# Patient Record
Sex: FEMALE | Employment: FULL TIME | URBAN - METROPOLITAN AREA
[De-identification: names, ages, dates, MRNs, and addresses within clinical notes are randomized per-mention and may not be internally consistent; named-entity substitution may affect disease eponyms.]

---

## 2017-07-29 ENCOUNTER — TRANSFERRED RECORDS (OUTPATIENT)
Dept: HEALTH INFORMATION MANAGEMENT | Facility: CLINIC | Age: 42
End: 2017-07-29

## 2017-08-07 ENCOUNTER — TRANSFERRED RECORDS (OUTPATIENT)
Dept: HEALTH INFORMATION MANAGEMENT | Facility: CLINIC | Age: 42
End: 2017-08-07

## 2017-09-11 ENCOUNTER — MEDICAL CORRESPONDENCE (OUTPATIENT)
Dept: HEALTH INFORMATION MANAGEMENT | Facility: CLINIC | Age: 42
End: 2017-09-11

## 2017-09-11 ENCOUNTER — TRANSFERRED RECORDS (OUTPATIENT)
Dept: HEALTH INFORMATION MANAGEMENT | Facility: CLINIC | Age: 42
End: 2017-09-11

## 2017-09-13 ENCOUNTER — CARE COORDINATION (OUTPATIENT)
Dept: ONCOLOGY | Facility: CLINIC | Age: 42
End: 2017-09-13

## 2017-09-13 NOTE — PROGRESS NOTES
On 9/12/17 author received call from nurse Adriana for Dr. Chyna Thacker, [facility], Alyse requesting Dr. Chu Diana call Dr. Thacker about possible referral.  Later received message she left thanking author for having Dr. Diana call.  Went on to say Dr. Diana agreed to see pt rather than have pt wait until 10/9 to see Dr. Fuentes.    Per conversation with Dr. Diana, today, 9/13.  He will see the patient on 9/27 at 12:30.  Spoke with Ketty, coworker at oncology new pt scheduling.  Informed her of change in plan for pt to see Dr. Diana on 9/27.  Also stated did not find records in [facility] and that pt had BM bx at [facility].  Dr. Diana wants slides from BM bx reviewed by our pathology.  Ketty changed pt's new pt appt and stated colleague, Kallie received records from [facility] yesterday.  She will check on obtaining slides from BMbx for review here.  She states she would also contact Adriana (or Tatiana) at Dr. Thacker's office to let them know new appt date/time.

## 2017-09-18 ENCOUNTER — TRANSFERRED RECORDS (OUTPATIENT)
Dept: HEALTH INFORMATION MANAGEMENT | Facility: CLINIC | Age: 42
End: 2017-09-18

## 2017-09-19 ENCOUNTER — TRANSFERRED RECORDS (OUTPATIENT)
Dept: HEALTH INFORMATION MANAGEMENT | Facility: CLINIC | Age: 42
End: 2017-09-19

## 2017-09-27 ENCOUNTER — OFFICE VISIT (OUTPATIENT)
Dept: TRANSPLANT | Facility: CLINIC | Age: 42
End: 2017-09-27
Attending: INTERNAL MEDICINE
Payer: COMMERCIAL

## 2017-09-27 VITALS
HEIGHT: 67 IN | DIASTOLIC BLOOD PRESSURE: 88 MMHG | WEIGHT: 150 LBS | BODY MASS INDEX: 23.54 KG/M2 | OXYGEN SATURATION: 100 % | RESPIRATION RATE: 16 BRPM | HEART RATE: 78 BPM | TEMPERATURE: 97.9 F | SYSTOLIC BLOOD PRESSURE: 138 MMHG

## 2017-09-27 DIAGNOSIS — D47.2 MGUS (MONOCLONAL GAMMOPATHY OF UNKNOWN SIGNIFICANCE): Primary | ICD-10-CM

## 2017-09-27 PROBLEM — D70.9 NEUTROPENIA (H): Status: ACTIVE | Noted: 2017-09-27

## 2017-09-27 LAB
BASOPHILS # BLD AUTO: 0 10E9/L (ref 0–0.2)
BASOPHILS NFR BLD AUTO: 0 %
CRP SERPL-MCNC: 5.3 MG/L (ref 0–8)
DIFFERENTIAL METHOD BLD: ABNORMAL
EOSINOPHIL # BLD AUTO: 0.1 10E9/L (ref 0–0.7)
EOSINOPHIL NFR BLD AUTO: 3.8 %
ERYTHROCYTE [DISTWIDTH] IN BLOOD BY AUTOMATED COUNT: 12.9 % (ref 10–15)
HCT VFR BLD AUTO: 38.7 % (ref 35–47)
HGB BLD-MCNC: 13 G/DL (ref 11.7–15.7)
IMM GRANULOCYTES # BLD: 0 10E9/L (ref 0–0.4)
IMM GRANULOCYTES NFR BLD: 0 %
LYMPHOCYTES # BLD AUTO: 1.3 10E9/L (ref 0.8–5.3)
LYMPHOCYTES NFR BLD AUTO: 62.2 %
MCH RBC QN AUTO: 31.8 PG (ref 26.5–33)
MCHC RBC AUTO-ENTMCNC: 33.6 G/DL (ref 31.5–36.5)
MCV RBC AUTO: 95 FL (ref 78–100)
MONOCYTES # BLD AUTO: 0.3 10E9/L (ref 0–1.3)
MONOCYTES NFR BLD AUTO: 12 %
NEUTROPHILS # BLD AUTO: 0.5 10E9/L (ref 1.6–8.3)
NEUTROPHILS NFR BLD AUTO: 22 %
NRBC # BLD AUTO: 0 10*3/UL
NRBC BLD AUTO-RTO: 0 /100
PLATELET # BLD AUTO: 159 10E9/L (ref 150–450)
RBC # BLD AUTO: 4.09 10E12/L (ref 3.8–5.2)
TSH SERPL DL<=0.005 MIU/L-ACNC: 1.92 MU/L (ref 0.4–4)
WBC # BLD AUTO: 2.1 10E9/L (ref 4–11)

## 2017-09-27 PROCEDURE — 36415 COLL VENOUS BLD VENIPUNCTURE: CPT

## 2017-09-27 PROCEDURE — 85025 COMPLETE CBC W/AUTO DIFF WBC: CPT | Performed by: INTERNAL MEDICINE

## 2017-09-27 PROCEDURE — 99213 OFFICE O/P EST LOW 20 MIN: CPT | Mod: ZF

## 2017-09-27 PROCEDURE — 86162 COMPLEMENT TOTAL (CH50): CPT | Performed by: INTERNAL MEDICINE

## 2017-09-27 PROCEDURE — 84443 ASSAY THYROID STIM HORMONE: CPT | Performed by: INTERNAL MEDICINE

## 2017-09-27 PROCEDURE — 86160 COMPLEMENT ANTIGEN: CPT | Performed by: INTERNAL MEDICINE

## 2017-09-27 PROCEDURE — 86140 C-REACTIVE PROTEIN: CPT | Performed by: INTERNAL MEDICINE

## 2017-09-27 RX ORDER — FOLIC ACID 1 MG/1
1 TABLET ORAL DAILY
Qty: 60 TABLET | Refills: 3 | Status: SHIPPED | OUTPATIENT
Start: 2017-09-27

## 2017-09-27 ASSESSMENT — PAIN SCALES - GENERAL: PAINLEVEL: NO PAIN (0)

## 2017-09-27 NOTE — PROGRESS NOTES
HISTORY OF PRESENT ILLNESS:  I had the pleasure of seeing Willow Beltran, a 41-year-old woman, in consultation for a second opinion for Dr. Chyna Thacker for evaluation of monoclonal gammopathy of uncertain significance and neutropenia.  Willow tells me that she really started this process probably in January with recurrent infections.  She said that when she would have travel she would come down with a cold.  She states that this would happen every few weeks and she just was not feeling herself.  She noted around Memorial Day that she had a sore throat and a fever.  She was seen by urgent care.  A Strep screen was made and it was found that she had no evidence of Strep.  She subsequently had a blood count done which showed a white count of 2300 and an ANC of 600.  For her sore throat, she had been given some Decadron.  She was referred to an Infectious Disease specialist and was tested for HIV, mono, and other viral infections.  She was given Percocet for pain relief.  She was ultimately given azithromycin for 3 days.  Her mouth sores did improve, but she continued to have a low white blood count of 1900 on 06/28.  In July she was seen by Dr. Chyna Thacker.  Her white count was 1.5 with an ANC of 0.1.  She has marked rouleaux formation.  Her B12 is a little low at 246.  Her hemoglobin was 12.4 and platelet count 154,000.  Her smear showed absolute neutropenia without an aberrant phenotype or T-cells.  No LGLs were seen.  Her COURTNEY was negative.  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  Anti granulocyte and anti gluten antibodies were negative.She ultimately had a bone marrow biopsy done.  The bone marrow on 07/13 showed 50% normocellular marrow with normal red cell precursors.  The bone marrow aspirate showed that she had neutrophils, metamyelocytes, monocytes, eosinophils and blasts; in other words, full maturation.  However, she did have an absolute neutropenia of 100.  Other studies showed normal cytogenetics.  She also had  plasma cells, approximately 5% cellularity.  The plasma cells did show monotypic IgG kappa.  They were CD38 bright but did not express CD45, CD19, CD56 or CD20.  There was also a B-cell gene rearrangement analysis which showed a clonal B-cell population to be detected.  Serum protein electrophoresis showed a total protein of 8.1 with an IgG kappa of 2110, a monoclonal peak of  1.4 grams percent, increased kappa chains at 6.9 and free lambda of 1.5.  Ultrasound showed multiple gallstones within the gallbladder and a nonobstructing right kidney stone was noted within the renal pelvis.  A 24-hour urine protein electrophoresis did show evidence of monoclonal free kappa light chains.  She was seen at the [facility] for evaluation of the MGUS.  It was felt that she had an IgG kappa monoclonal gammopathy of uncertain significance.  A PET scan was done which showed no hypermetabolic lesions in August and no abnormal skeletal lesions.  She did have cholelithiasis.  Her spleen size was normal.  There was some increased uptake in her tonsils, presumably inflammatory.  She was tried on G-CSF and did have a brief response and then subsequently did not respond.  Her ANC initially went up to 2.9 but then after the third dose to 8.3.  On  she was started on prednisone 60 mg.  Her ANC did improve to 0.4 and on  it was up to 2.0.  Subsequently, the prednisone has been tapered off  2 days ago.  She is here for further evaluation.       PAST MEDICAL HISTORY:  She has had 3  sections.  She has had an IUD.      FAMILY HISTORY:  Her mother  of complications related to cervical cancer.  Her father is alive and well.  Her father had a cerebrovascular accident, high cholesterol and high blood pressure.  Her siblings are well.  She has four brothers.  One may have had hepatitis in the past, most likely    hepatitis A.  She has two children ages 10 and 7  who are healthy.       SOCIAL HISTORY:  She does not smoke.  She  has occasional alcohol.  She works as an .  Her  is in computer hardware.        REVIEW OF SYSTEMS:  A 12-point review of systems is done and is negative except as in HPI.      PHYSICAL EXAMINATION:   GENERAL:  She is an alert woman in no acute distress.   VITAL SIGNS:  Stable.   HEENT:  Normocephalic.  EOM okay, no scleral icterus.  No lesions noted in her mouth.   RESPIRATORY:  Clear to percussion and auscultation.   NECK:  She has mild thyromegaly but no nodules palpable.   CARDIAC:  Normal sinus rhythm.  No S3, S4 or murmur.   ABDOMEN:  Soft without hepatosplenomegaly.   EXTREMITIES:  Without edema.   LYMPH:  No cervical, inguinal or axillary adenopathy.   NEUROLOGIC:  Cranial nerves II-XII intact.  DTRs intact.    Results for ELEUTERIO JOHN (MRN [id number]) as of 9/27/2017 17:15   Ref. Range 9/27/2017 14:12   CRP Inflammation Latest Ref Range: 0.0 - 8.0 mg/L 5.3   TSH Latest Ref Range: 0.40 - 4.00 mU/L 1.92   WBC Latest Ref Range: 4.0 - 11.0 10e9/L 2.1 (L)   Hemoglobin Latest Ref Range: 11.7 - 15.7 g/dL 13.0   Hematocrit Latest Ref Range: 35.0 - 47.0 % 38.7   Platelet Count Latest Ref Range: 150 - 450 10e9/L 159   RBC Count Latest Ref Range: 3.8 - 5.2 10e12/L 4.09   MCV Latest Ref Range: 78 - 100 fl 95   MCH Latest Ref Range: 26.5 - 33.0 pg 31.8   MCHC Latest Ref Range: 31.5 - 36.5 g/dL 33.6   RDW Latest Ref Range: 10.0 - 15.0 % 12.9   Diff Method Unknown Automated Method   % Neutrophils Latest Units: % 22.0   % Lymphocytes Latest Units: % 62.2   % Monocytes Latest Units: % 12.0   % Eosinophils Latest Units: % 3.8   % Basophils Latest Units: % 0.0   % Immature Granulocytes Latest Units: % 0.0   Nucleated RBCs Latest Ref Range: 0 /100 0   Absolute Neutrophil Latest Ref Range: 1.6 - 8.3 10e9/L 0.5 (L)   Absolute Lymphocytes Latest Ref Range: 0.8 - 5.3 10e9/L 1.3   Absolute Monocytes Latest Ref Range: 0.0 - 1.3 10e9/L 0.3   Absolute Eosinophils Latest Ref Range: 0.0 - 0.7 10e9/L 0.1   Absolute  Basophils Latest Ref Range: 0.0 - 0.2 10e9/L 0.0   Abs Immature Granulocytes Latest Ref Range: 0 - 0.4 10e9/L 0.0   Absolute Nucleated RBC Unknown 0.0     ASSESSMENT:   1.  Neutropenia, presumed idiopathic.   2.  Monoclonal gammopathy of uncertain significance, IgG kappa.   3.  Gallstones.   4.  Kidney stone.   5.  Status post  section.      I spent about an hour with Willow discussing the possibility that the monoclonal gammopathy is in some way related to the neutropenia.  What is interesting is that she has been sick really since January.  I do not know if we had neutrophil counts from January to .  I find it hard to believe that the monoclonal gammopathy is not some way related to the neutropenia.  A manuscript in International Journal of Hematology 73, p [phone],by Paulo showed an increased frequency of monoclonal gammopathy of uncertain significance in patients with nonimmune chronic idiopathic neutropenia.  They did not really speak about how this may be pathogenic in the etiology of neutropenia.  What is surprising is that all maturation forms of the neutrophil and myeloid series are present in her marrow.  Why are not the neutrophils getting into the blood?  One wonders whether or not there is in some way an attack, immune I presume, related to adhesion molecules, but I really cannot prove that and that is quite speculative.  She does have a B-cell gene rearrangement.  The plasma cells are monoclonal IgG kappa but are CD20 negative.  I am wondering whether or not it would be reasonable to consider treating this immune neutropenia with bortezomib.  I think it may be reasonable.  I would consider bortezomib 1.3 mg/m2 subq weekly along with 20 mg of dexamethasone for 4 - 8 weeks.  I would probably have her on trimethoprim sulfa or pentamidine as well  as aspirin if I were to do that as well.  She does have Levaquin at home.  I also would like to check her thyroid functions and complement levels  as well.  Today her ANC is 500 with a white count of 2200.  One could also consider other treatments that may help this neutropenia such as lenalidomide, but I wonder really whether or not we should not focus with bortezomib as an immune-modulating agent.  She has seen Dr. King at the [facility] and I have spoken with him as well as with Dr. Thacker.  At this time, I might just follow her along and see how she does now that she is off steroids and see if she develops any infections.Finally , not sure if gallstones need to be addressed now as she is asymptomatic.  Thank Dr Thacker for referring this delightful woman to the [facility] [place] [place]

## 2017-09-27 NOTE — MR AVS SNAPSHOT
After Visit Summary   9/27/2017    Willow Beltran    MRN: [id number]           Patient Information     Date Of Birth          1975        Visit Information        Provider Department      9/27/2017 12:30 PM Ulises Diana MD [facility] Blood and Marrow Transplant        Today's Diagnoses     MGUS (monoclonal gammopathy of unknown significance)    -  1          [facility] ([facility]  [address] [place] [address]  Phone: [phone]  Clinic Hours:   Monday-Thursday:7am to 7pm   Friday: 7am to 5pm   Weekends and holidays:    8am to noon (in general)  If your fever is 100.5  or greater,   call the clinic.  After hours call the   hospital at [phone] or   [phone]. Ask for the BMT   fellow on-call            Follow-ups after your visit        Who to contact     If you have questions or need follow up information about today's clinic visit or your schedule please contact [facility] directly at [phone].  Normal or non-critical lab and imaging results will be communicated to you by MEEPhart, letter or phone within 4 business days after the clinic has received the results. If you do not hear from us within 7 days, please contact the clinic through Wakie/Budist or phone. If you have a critical or abnormal lab result, we will notify you by phone as soon as possible.  Submit refill requests through [facility]ie/Budist or call your pharmacy and they will forward the refill request to us. Please allow 3 business days for your refill to be completed.          Additional Information About Your Visit        MEEPhart Information     [facility]ie/Budist gives you secure access to your electronic health record. If you see a primary care provider, you can also send messages to your care team and make appointments. If you have questions, please call your primary care clinic.  If you do not have a primary care provider, please call [phone] and they will assist you.    "     Care EveryWhere ID     This is your Care EveryWhere ID. This could be used by other organizations to access your [place] medical records  [id number]        Your Vitals Were     Pulse Temperature Respirations Height Pulse Oximetry BMI (Body Mass Index)    78 97.9  F (36.6  C) (Oral) 16 1.702 m (5' 7\") 100% 23.49 kg/m2       Blood Pressure from Last 3 Encounters:   09/27/17 138/88    Weight from Last 3 Encounters:   09/27/17 68 kg (150 lb)              We Performed the Following     CBC with platelets differential     Complement C3     Complement C4     Complement total     CRP inflammation     TSH with free T4 reflex          Today's Medication Changes          These changes are accurate as of: 9/27/17  5:17 PM.  If you have any questions, ask your nurse or doctor.               Start taking these medicines.        Dose/Directions    folic acid 1 MG tablet   Commonly known as:  FOLVITE   Used for:  MGUS (monoclonal gammopathy of unknown significance)   Started by:  Ulises Diana MD        Dose:  1 mg   Take 1 tablet (1 mg) by mouth daily   Quantity:  60 tablet   Refills:  3            Where to get your medicines      These medications were sent to [facility] [id number] [place] [address]  [address] [place] [address]     Phone:  [phone]     folic acid 1 MG tablet                Recent Review Flowsheet Data     BMT Recent Results Latest Ref Rng & Units 9/27/2017    WBC 4.0 - 11.0 10e9/L 2.1(L)    Hemoglobin 11.7 - 15.7 g/dL 13.0    Platelet Count 150 - 450 10e9/L 159    Neutrophils (Absolute) 1.6 - 8.3 10e9/L 0.5(L)    MCV 78 - 100 fl 95               Primary Care Provider Office Phone # Fax #    Toni Russell [phone]       [facility] [address]   [facility] [address]        Equal Access to Services     MELANIE WATTERS AH: Hadii venessa perez hadasho Soomaali, waaxda luqadaha, qaybta kaalmada adeegyada, eliseo solomon " alexia coughlinedalink davisMichaelaakenny ah. So [facility] [phone].    ATENCIÓN: Si habla vincenzo, tiene a allison disposición servicios gratuitos de asistencia lingüística. Nilesh al [phone].    We comply with applicable federal civil rights laws and [place] laws. We do not discriminate on the basis of race, color, national origin, age, disability sex, sexual orientation or gender identity.            Thank you!     Thank you for choosing [facility]  for your care. Our goal is always to provide you with excellent care. Hearing back from our patients is one way we can continue to improve our services. Please take a few minutes to complete the written survey that you may receive in the mail after your visit with us. Thank you!             Your Updated Medication List - Protect others around you: Learn how to safely use, store and throw away your medicines at www.disposemymeds.org.          This list is accurate as of: 9/27/17  5:17 PM.  Always use your most recent med list.                   Brand Name Dispense Instructions for use Diagnosis    folic acid 1 MG tablet    FOLVITE    60 tablet    Take 1 tablet (1 mg) by mouth daily    MGUS (monoclonal gammopathy of unknown significance)

## 2017-09-27 NOTE — NURSING NOTE
"Oncology Rooming Note    September 27, 2017 1:11 PM   Willow Beltran is a 41 year old female who presents for:    Chief Complaint   Patient presents with     RECHECK     Neutropenia~ here for new eval      Initial Vitals: /88  Pulse 78  Temp 97.9  F (36.6  C) (Oral)  Resp 16  Ht 1.702 m (5' 7\")  Wt 68 kg (150 lb)  SpO2 100%  BMI 23.49 kg/m2 Estimated body mass index is 23.49 kg/(m^2) as calculated from the following:    Height as of this encounter: 1.702 m (5' 7\").    Weight as of this encounter: 68 kg (150 lb). Body surface area is 1.79 meters squared.  No Pain (0) Comment: Data Unavailable   No LMP recorded.  Allergies reviewed: Yes  Medications reviewed: Yes    Medications: Medication refills not needed today.  Pharmacy name entered into EPIC: Data Unavailable    Clinical concerns: no md was NOT notified.    8 minutes for nursing intake (face to face time)     Celia Bowen RN                "

## 2017-09-27 NOTE — LETTER
9/27/2017       RE: Willow Beltran  [address]  [address]  [facility] [address]     Dear Colleague,    Thank you for referring your patient, Willow Beltran, to the [facility] BLOOD AND MARROW TRANSPLANT at [facility]. Please see a copy of my visit note below.    HISTORY OF PRESENT ILLNESS:  I had the pleasure of seeing Willow Beltran, a 41-year-old woman, in consultation for a second opinion for Dr. Chyna Thacker for evaluation of monoclonal gammopathy of uncertain significance and neutropenia.  Willow tells me that she really started this process probably in January with recurrent infections.  She said that when she would have travel she would come down with a cold.  She states that this would happen every few weeks and she just was not feeling herself.  She noted around Memorial Day that she had a sore throat and a fever.  She was seen by urgent care.  A Strep screen was made and it was found that she had no evidence of Strep.  She subsequently had a blood count done which showed a white count of 2300 and an ANC of 600.  For her sore throat, she had been given some Decadron.  She was referred to an Infectious Disease specialist and was tested for HIV, mono, and other viral infections.  She was given Percocet for pain relief.  She was ultimately given azithromycin for 3 days.  Her mouth sores did improve, but she continued to have a low white blood count of 1900 on 06/28.  In July she was seen by Dr. Chyna Thacker.  Her white count was 1.5 with an ANC of 0.1.  She has marked rouleaux formation.  Her B12 is a little low at 246.  Her hemoglobin was 12.4 and platelet count 154,000.  Her smear showed absolute neutropenia without an aberrant phenotype or T-cells.  No LGLs were seen.  Her COURTNEY was negative.  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  Anti granulocyte and anti gluten antibodies were negative.She ultimately had a bone marrow biopsy done.  The bone marrow on 07/13 showed  50% normocellular marrow with normal red cell precursors.  The bone marrow aspirate showed that she had neutrophils, metamyelocytes, monocytes, eosinophils and blasts; in other words, full maturation.  However, she did have an absolute neutropenia of 100.  Other studies showed normal cytogenetics.  She also had plasma cells, approximately 5% cellularity.  The plasma cells did show monotypic IgG kappa.  They were CD38 bright but did not express CD45, CD19, CD56 or CD20.  There was also a B-cell gene rearrangement analysis which showed a clonal B-cell population to be detected.  Serum protein electrophoresis showed a total protein of 8.1 with an IgG kappa of 2110, a monoclonal peak of  1.4 grams percent, increased kappa chains at 6.9 and free lambda of 1.5.  Ultrasound showed multiple gallstones within the gallbladder and a nonobstructing right kidney stone was noted within the renal pelvis.  A 24-hour urine protein electrophoresis did show evidence of monoclonal free kappa light chains.  She was seen at the [facility] for evaluation of the MGUS.  It was felt that she had an IgG kappa monoclonal gammopathy of uncertain significance.  A PET scan was done which showed no hypermetabolic lesions in August and no abnormal skeletal lesions.  She did have cholelithiasis.  Her spleen size was normal.  There was some increased uptake in her tonsils, presumably inflammatory.  She was tried on G-CSF and did have a brief response and then subsequently did not respond.  Her ANC initially went up to 2.9 but then after the third dose to 8.3.  On  she was started on prednisone 60 mg.  Her ANC did improve to 0.4 and on  it was up to 2.0.  Subsequently, the prednisone has been tapered off  2 days ago.  She is here for further evaluation.       PAST MEDICAL HISTORY:  She has had 3  sections.  She has had an IUD.      FAMILY HISTORY:  Her mother  of complications related to cervical cancer.  Her father is alive and  well.  Her father had a cerebrovascular accident, high cholesterol and high blood pressure.  Her siblings are well.  She has four brothers.  One may have had hepatitis in the past, most likely    hepatitis A.  She has two children ages 10 and 7  who are healthy.       SOCIAL HISTORY:  She does not smoke.  She has occasional alcohol.  She works as an .  Her  is in computer hardware.        REVIEW OF SYSTEMS:  A 12-point review of systems is done and is negative except as in HPI.      PHYSICAL EXAMINATION:   GENERAL:  She is an alert woman in no acute distress.   VITAL SIGNS:  Stable.   HEENT:  Normocephalic.  EOM okay, no scleral icterus.  No lesions noted in her mouth.   RESPIRATORY:  Clear to percussion and auscultation.   NECK:  She has mild thyromegaly but no nodules palpable.   CARDIAC:  Normal sinus rhythm.  No S3, S4 or murmur.   ABDOMEN:  Soft without hepatosplenomegaly.   EXTREMITIES:  Without edema.   LYMPH:  No cervical, inguinal or axillary adenopathy.   NEUROLOGIC:  Cranial nerves II-XII intact.  DTRs intact.    Results for ELEUTERIO JOHN (MRN [id number]) as of 9/27/2017 17:15   Ref. Range 9/27/2017 14:12   CRP Inflammation Latest Ref Range: 0.0 - 8.0 mg/L 5.3   TSH Latest Ref Range: 0.40 - 4.00 mU/L 1.92   WBC Latest Ref Range: 4.0 - 11.0 10e9/L 2.1 (L)   Hemoglobin Latest Ref Range: 11.7 - 15.7 g/dL 13.0   Hematocrit Latest Ref Range: 35.0 - 47.0 % 38.7   Platelet Count Latest Ref Range: 150 - 450 10e9/L 159   RBC Count Latest Ref Range: 3.8 - 5.2 10e12/L 4.09   MCV Latest Ref Range: 78 - 100 fl 95   MCH Latest Ref Range: 26.5 - 33.0 pg 31.8   MCHC Latest Ref Range: 31.5 - 36.5 g/dL 33.6   RDW Latest Ref Range: 10.0 - 15.0 % 12.9   Diff Method Unknown Automated Method   % Neutrophils Latest Units: % 22.0   % Lymphocytes Latest Units: % 62.2   % Monocytes Latest Units: % 12.0   % Eosinophils Latest Units: % 3.8   % Basophils Latest Units: % 0.0   % Immature Granulocytes Latest Units:  % 0.0   Nucleated RBCs Latest Ref Range: 0 /100 0   Absolute Neutrophil Latest Ref Range: 1.6 - 8.3 10e9/L 0.5 (L)   Absolute Lymphocytes Latest Ref Range: 0.8 - 5.3 10e9/L 1.3   Absolute Monocytes Latest Ref Range: 0.0 - 1.3 10e9/L 0.3   Absolute Eosinophils Latest Ref Range: 0.0 - 0.7 10e9/L 0.1   Absolute Basophils Latest Ref Range: 0.0 - 0.2 10e9/L 0.0   Abs Immature Granulocytes Latest Ref Range: 0 - 0.4 10e9/L 0.0   Absolute Nucleated RBC Unknown 0.0     ASSESSMENT:   1.  Neutropenia, presumed idiopathic.   2.  Monoclonal gammopathy of uncertain significance, IgG kappa.   3.  Gallstones.   4.  Kidney stone.   5.  Status post  section.      I spent about an hour with Willow discussing the possibility that the monoclonal gammopathy is in some way related to the neutropenia.  What is interesting is that she has been sick really since January.  I do not know if we had neutrophil counts from January to .  I find it hard to believe that the monoclonal gammopathy is not some way related to the neutropenia.  A manuscript in International Journal of Hematology 73, p [phone],by Paulo showed an increased frequency of monoclonal gammopathy of uncertain significance in patients with nonimmune chronic idiopathic neutropenia.  They did not really speak about how this may be pathogenic in the etiology of neutropenia.  What is surprising is that all maturation forms of the neutrophil and myeloid series are present in her marrow.  Why are not the neutrophils getting into the blood?  One wonders whether or not there is in some way an attack, immune I presume, related to adhesion molecules, but I really cannot prove that and that is quite speculative.  She does have a B-cell gene rearrangement.  The plasma cells are monoclonal IgG kappa but are CD20 negative.  I am wondering whether or not it would be reasonable to consider treating this immune neutropenia with bortezomib.  I think it may be reasonable.  I would  consider bortezomib 1.3 mg/m2 subq weekly along with 20 mg of dexamethasone for 4 - 8 weeks.  I would probably have her on trimethoprim sulfa or pentamidine as well  as aspirin if I were to do that as well.  She does have Levaquin at home.  I also would like to check her thyroid functions and complement levels as well.  Today her ANC is 500 with a white count of 2200.  One could also consider other treatments that may help this neutropenia such as lenalidomide, but I wonder really whether or not we should not focus with bortezomib as an immune-modulating agent.  She has seen Dr. King at the [facility] and I have spoken with him as well as with Dr. Thacker.  At this time, I might just follow her along and see how she does now that she is off steroids and see if she develops any infections.Finally , not sure if gallstones need to be addressed now as she is asymptomatic.  Thank Dr Thacker for referring this delightful woman to the [facility] hematology clinic      Again, thank you for allowing me to participate in the care of your patient.      Sincerely,    Ulises Diana MD

## 2017-09-28 LAB
C3 SERPL-MCNC: 136 MG/DL (ref 76–169)
C4 SERPL-MCNC: 9 MG/DL (ref 15–50)

## 2017-09-29 PROCEDURE — 00000345 ZZHCL STATISTIC REV BONE MARROW OUTSIDE SLIDES TC 88321: Performed by: INTERNAL MEDICINE

## 2017-09-30 LAB — CH50 SERPL-ACNC: 54 CAE UNITS (ref 60–144)

## 2017-10-03 LAB — COPATH REPORT: NORMAL

## 2017-10-05 ENCOUNTER — TRANSFERRED RECORDS (OUTPATIENT)
Dept: HEALTH INFORMATION MANAGEMENT | Facility: CLINIC | Age: 42
End: 2017-10-05

## 2018-01-21 ENCOUNTER — HEALTH MAINTENANCE LETTER (OUTPATIENT)
Age: 43
End: 2018-01-21

## 2019-02-14 ENCOUNTER — TRANSFERRED RECORDS (OUTPATIENT)
Dept: HEALTH INFORMATION MANAGEMENT | Facility: CLINIC | Age: 44
End: 2019-02-14

## 2019-06-10 ENCOUNTER — TRANSFERRED RECORDS (OUTPATIENT)
Dept: HEALTH INFORMATION MANAGEMENT | Facility: CLINIC | Age: 44
End: 2019-06-10

## 2020-01-29 ENCOUNTER — OFFICE VISIT (OUTPATIENT)
Dept: FAMILY MEDICINE | Facility: CLINIC | Age: 45
End: 2020-01-29
Payer: COMMERCIAL

## 2020-01-29 VITALS
BODY MASS INDEX: 23.65 KG/M2 | TEMPERATURE: 97.9 F | WEIGHT: 151 LBS | SYSTOLIC BLOOD PRESSURE: 102 MMHG | DIASTOLIC BLOOD PRESSURE: 70 MMHG | HEART RATE: 95 BPM | RESPIRATION RATE: 12 BRPM | OXYGEN SATURATION: 100 %

## 2020-01-29 DIAGNOSIS — B96.89 BACTERIAL SINUSITIS: Primary | ICD-10-CM

## 2020-01-29 DIAGNOSIS — J32.9 BACTERIAL SINUSITIS: Primary | ICD-10-CM

## 2020-01-29 DIAGNOSIS — T36.95XA ANTIBIOTIC-INDUCED YEAST INFECTION: ICD-10-CM

## 2020-01-29 DIAGNOSIS — B37.9 ANTIBIOTIC-INDUCED YEAST INFECTION: ICD-10-CM

## 2020-01-29 PROCEDURE — 99214 OFFICE O/P EST MOD 30 MIN: CPT | Performed by: NURSE PRACTITIONER

## 2020-01-29 RX ORDER — FLUCONAZOLE 150 MG/1
150 TABLET ORAL ONCE
Qty: 1 TABLET | Refills: 0 | Status: SHIPPED | OUTPATIENT
Start: 2020-01-29 | End: 2020-01-29

## 2020-01-29 ASSESSMENT — ENCOUNTER SYMPTOMS
SINUS PRESSURE: 1
RHINORRHEA: 1
DIZZINESS: 0
ADENOPATHY: 0
NAUSEA: 0
CHILLS: 1
SHORTNESS OF BREATH: 1
FEVER: 1
MYALGIAS: 1
SINUS PAIN: 1
WHEEZING: 0
SORE THROAT: 0
VOMITING: 0
FATIGUE: 1
ABDOMINAL PAIN: 0
COUGH: 0
HEADACHES: 1
DIARRHEA: 0
LIGHT-HEADEDNESS: 0

## 2020-01-29 NOTE — PROGRESS NOTES
SUBJECTIVE:   Willow Beltran is a 44 year old female presenting with a chief complaint of   Chief Complaint   Patient presents with     Sinus Problem     sinus pain and pressure x2 weeks; has tried using Netti pot with not much relief       She is a new patient of [place].    URI Adult    Onset of symptoms was 2 week(s) ago.  Course of illness is worsening.    Severity moderate  Current and Associated symptoms: fever, chills, runny nose, stuffy nose, shortness of breath, facial pain/pressure, headache, body aches and fatigue  Treatment measures tried include netti pot.  Predisposing factors include ill contact: Family member  and exposure to influenza.  Denies history of seasonal allergies or asthma.  She is a non-smoker.     Review of Systems   Constitutional: Positive for chills, fatigue and fever.   HENT: Positive for congestion, ear pain (bilateral but more right), rhinorrhea, sinus pressure and sinus pain. Negative for sore throat.    Eyes: Negative for visual disturbance.   Respiratory: Positive for shortness of breath. Negative for cough and wheezing.    Cardiovascular: Negative for chest pain.   Gastrointestinal: Negative for abdominal pain, diarrhea, nausea and vomiting.   Musculoskeletal: Positive for myalgias.   Skin: Negative for rash.   Allergic/Immunologic: Negative for environmental allergies.   Neurological: Positive for headaches. Negative for dizziness and light-headedness.   Hematological: Negative for adenopathy.       History reviewed. No pertinent past medical history.  No family history on file.  Current Outpatient Medications   Medication Sig Dispense Refill     amoxicillin-clavulanate (AUGMENTIN) 875-125 MG tablet Take 1 tablet by mouth 2 times daily for 10 days 20 tablet 0     Cyanocobalamin (VITAMIN B-12 PO) Take 100 mcg by mouth       fluconazole (DIFLUCAN) 150 MG tablet Take 1 tablet (150 mg) by mouth once for 1 dose 1 tablet 0     folic acid (FOLVITE) 1 MG tablet Take 1 tablet (1  mg) by mouth daily 60 tablet 3     Multiple Vitamins-Minerals (MULTIVITAMIN ADULT PO) Take 1 tablet by mouth       Social History     Tobacco Use     Smoking status: Never Smoker     Smokeless tobacco: Never Used   Substance Use Topics     Alcohol use: Not Currently       OBJECTIVE  /70 (BP Location: Right arm, Patient Position: Sitting, Cuff Size: Adult Regular)   Pulse 95   Temp 97.9  F (36.6  C) (Oral)   Resp 12   Wt 68.5 kg (151 lb)   SpO2 100%   BMI 23.65 kg/m      Physical Exam  Vitals signs and nursing note reviewed.   Constitutional:       Appearance: Normal appearance. She is well-developed.   HENT:      Head: Normocephalic and atraumatic.      Right Ear: Ear canal and external ear normal. A middle ear effusion is present.      Left Ear: Ear canal and external ear normal. A middle ear effusion is present.      Nose: Congestion and rhinorrhea present.      Right Sinus: Maxillary sinus tenderness and frontal sinus tenderness present.      Left Sinus: Maxillary sinus tenderness and frontal sinus tenderness present.      Mouth/Throat:      Pharynx: Oropharynx is clear.   Eyes:      Extraocular Movements: Extraocular movements intact.      Conjunctiva/sclera: Conjunctivae normal.      Pupils: Pupils are equal, round, and reactive to light.   Neck:      Musculoskeletal: Normal range of motion and neck supple.   Cardiovascular:      Rate and Rhythm: Normal rate and regular rhythm.      Heart sounds: Normal heart sounds.   Pulmonary:      Effort: Pulmonary effort is normal.      Breath sounds: Normal breath sounds and air entry.   Lymphadenopathy:      Head:      Right side of head: No submandibular or tonsillar adenopathy.      Left side of head: No submandibular or tonsillar adenopathy.      Cervical: Cervical adenopathy present.   Skin:     General: Skin is warm and dry.   Neurological:      Mental Status: She is alert and oriented to person, place, and time.   Psychiatric:         Behavior: Behavior  is cooperative.       ASSESSMENT:      ICD-10-CM    1. Bacterial sinusitis J32.9 amoxicillin-clavulanate (AUGMENTIN) 875-125 MG tablet    B96.89    2. Antibiotic-induced yeast infection B37.9 fluconazole (DIFLUCAN) 150 MG tablet    T36.95XA         Medical Decision Making:    Differential Diagnosis:  URI Adult/Peds:  Acute right otitis media, Acute left otitis media, Bronchitis-viral, Sinusitis, Viral syndrome and Viral upper respiratory illness    Serious Comorbid Conditions:  Adult:  None    PLAN:  Discussed with patient that will start her on augmentin twice a day for 10 days to cover for bacterial sinusitis. Will do diflucan as needed for antibiotic induced yeast infection. Additional symptomatic treatment recommendations discussed. Education was added to AVS. Patient was agreeable to plan and verbalized understanding.       Followup:    If not improving in 1 week or if condition worsens, follow up with your Primary Care Provider    Patient Instructions   Augmentin twice a day for 10 days  Diflucan if needed for yeast infection symptoms  Push Fluids  Plenty of rest  Tylenol and ibuprofen to help with pain or fever  Humidified air can help with congestion  Nasal saline or Flonase nasal spray to help with congestion  Salt water gargles, anesthetic throat spray, or lozenges to help with sore throat.   Can continue with Neti Pot.

## 2020-01-29 NOTE — PATIENT INSTRUCTIONS
Augmentin twice a day for 10 days  Diflucan if needed for yeast infection symptoms  Push Fluids  Plenty of rest  Tylenol and ibuprofen to help with pain or fever  Humidified air can help with congestion  Nasal saline or Flonase nasal spray to help with congestion  Salt water gargles, anesthetic throat spray, or lozenges to help with sore throat.   Can continue with Neti Pot.

## 2020-03-11 ENCOUNTER — HEALTH MAINTENANCE LETTER (OUTPATIENT)
Age: 45
End: 2020-03-11

## 2020-12-27 ENCOUNTER — HEALTH MAINTENANCE LETTER (OUTPATIENT)
Age: 45
End: 2020-12-27

## 2021-04-25 ENCOUNTER — HEALTH MAINTENANCE LETTER (OUTPATIENT)
Age: 46
End: 2021-04-25

## 2021-10-09 ENCOUNTER — HEALTH MAINTENANCE LETTER (OUTPATIENT)
Age: 46
End: 2021-10-09

## 2021-12-04 ENCOUNTER — HEALTH MAINTENANCE LETTER (OUTPATIENT)
Age: 46
End: 2021-12-04

## 2022-05-21 ENCOUNTER — HEALTH MAINTENANCE LETTER (OUTPATIENT)
Age: 47
End: 2022-05-21

## 2022-09-17 ENCOUNTER — HEALTH MAINTENANCE LETTER (OUTPATIENT)
Age: 47
End: 2022-09-17

## 2023-01-23 ENCOUNTER — HEALTH MAINTENANCE LETTER (OUTPATIENT)
Age: 48
End: 2023-01-23

## 2023-06-04 ENCOUNTER — HEALTH MAINTENANCE LETTER (OUTPATIENT)
Age: 48
End: 2023-06-04

## 2023-09-13 ENCOUNTER — LAB REQUISITION (OUTPATIENT)
Dept: LAB | Facility: CLINIC | Age: 48
End: 2023-09-13
Payer: COMMERCIAL

## 2023-09-13 DIAGNOSIS — R39.9 UNSPECIFIED SYMPTOMS AND SIGNS INVOLVING THE GENITOURINARY SYSTEM: ICD-10-CM

## 2023-09-13 PROCEDURE — 87086 URINE CULTURE/COLONY COUNT: CPT | Mod: ORL | Performed by: OBSTETRICS & GYNECOLOGY

## 2023-09-15 LAB — BACTERIA UR CULT: ABNORMAL

## 2024-01-10 ENCOUNTER — LAB REQUISITION (OUTPATIENT)
Dept: LAB | Facility: CLINIC | Age: 49
End: 2024-01-10
Payer: COMMERCIAL

## 2024-01-10 DIAGNOSIS — J32.9 CHRONIC SINUSITIS, UNSPECIFIED: ICD-10-CM

## 2024-01-10 LAB
GRAM STAIN RESULT: ABNORMAL

## 2024-01-10 PROCEDURE — 88364 INSITU HYBRIDIZATION (FISH): CPT | Mod: 26 | Performed by: PATHOLOGY

## 2024-01-10 PROCEDURE — 87102 FUNGUS ISOLATION CULTURE: CPT | Mod: ORL | Performed by: OTOLARYNGOLOGY

## 2024-01-10 PROCEDURE — 87075 CULTR BACTERIA EXCEPT BLOOD: CPT | Mod: ORL | Performed by: OTOLARYNGOLOGY

## 2024-01-10 PROCEDURE — 87205 SMEAR GRAM STAIN: CPT | Mod: ORL | Performed by: OTOLARYNGOLOGY

## 2024-01-10 PROCEDURE — 88305 TISSUE EXAM BY PATHOLOGIST: CPT | Mod: 26 | Performed by: PATHOLOGY

## 2024-01-10 PROCEDURE — 88342 IMHCHEM/IMCYTCHM 1ST ANTB: CPT | Mod: 26 | Performed by: PATHOLOGY

## 2024-01-10 PROCEDURE — 87186 SC STD MICRODIL/AGAR DIL: CPT | Mod: ORL | Performed by: OTOLARYNGOLOGY

## 2024-01-10 PROCEDURE — 88313 SPECIAL STAINS GROUP 2: CPT | Mod: 26 | Performed by: PATHOLOGY

## 2024-01-10 PROCEDURE — 88365 INSITU HYBRIDIZATION (FISH): CPT | Mod: 26 | Performed by: PATHOLOGY

## 2024-01-10 PROCEDURE — 88341 IMHCHEM/IMCYTCHM EA ADD ANTB: CPT | Mod: 26 | Performed by: PATHOLOGY

## 2024-01-10 PROCEDURE — 88341 IMHCHEM/IMCYTCHM EA ADD ANTB: CPT | Mod: TC,ORL | Performed by: OTOLARYNGOLOGY

## 2024-01-13 LAB
BACTERIA SPEC CULT: ABNORMAL
BACTERIA SPEC CULT: ABNORMAL

## 2024-01-17 LAB
BACTERIA WND CULT: ABNORMAL
BACTERIA WND CULT: ABNORMAL

## 2024-01-19 LAB
PATH REPORT.COMMENTS IMP SPEC: NORMAL
PATH REPORT.FINAL DX SPEC: NORMAL
PATH REPORT.GROSS SPEC: NORMAL
PATH REPORT.MICROSCOPIC SPEC OTHER STN: NORMAL
PATH REPORT.RELEVANT HX SPEC: NORMAL
PHOTO IMAGE: NORMAL

## 2024-01-26 ENCOUNTER — LAB REQUISITION (OUTPATIENT)
Dept: LAB | Facility: CLINIC | Age: 49
End: 2024-01-26
Payer: COMMERCIAL

## 2024-01-26 DIAGNOSIS — J32.9 CHRONIC SINUSITIS, UNSPECIFIED: ICD-10-CM

## 2024-01-26 PROCEDURE — 87075 CULTR BACTERIA EXCEPT BLOOD: CPT | Mod: ORL | Performed by: OTOLARYNGOLOGY

## 2024-01-26 PROCEDURE — 87186 SC STD MICRODIL/AGAR DIL: CPT | Mod: ORL | Performed by: OTOLARYNGOLOGY

## 2024-01-28 LAB
BACTERIA SPEC CULT: ABNORMAL
BACTERIA SPEC CULT: ABNORMAL

## 2024-02-02 LAB — BACTERIA SPEC CULT: ABNORMAL

## 2024-02-07 LAB — BACTERIA SPEC CULT: NO GROWTH

## 2025-06-28 ENCOUNTER — HEALTH MAINTENANCE LETTER (OUTPATIENT)
Age: 50
End: 2025-06-28